# Patient Record
Sex: FEMALE | Race: WHITE | NOT HISPANIC OR LATINO
[De-identification: names, ages, dates, MRNs, and addresses within clinical notes are randomized per-mention and may not be internally consistent; named-entity substitution may affect disease eponyms.]

---

## 2017-05-02 ENCOUNTER — APPOINTMENT (OUTPATIENT)
Dept: OTOLARYNGOLOGY | Facility: CLINIC | Age: 77
End: 2017-05-02

## 2017-05-02 VITALS
DIASTOLIC BLOOD PRESSURE: 77 MMHG | HEART RATE: 85 BPM | SYSTOLIC BLOOD PRESSURE: 119 MMHG | TEMPERATURE: 98.6 F | OXYGEN SATURATION: 97 %

## 2017-05-02 VITALS — HEIGHT: 62 IN | WEIGHT: 147 LBS | BODY MASS INDEX: 27.05 KG/M2

## 2017-05-02 DIAGNOSIS — M26.602 LEFT TEMPOROMANDIBULAR JOINT DISORDER,: ICD-10-CM

## 2017-05-02 DIAGNOSIS — H90.3 SENSORINEURAL HEARING LOSS, BILATERAL: ICD-10-CM

## 2017-05-26 ENCOUNTER — APPOINTMENT (OUTPATIENT)
Dept: OTOLARYNGOLOGY | Facility: CLINIC | Age: 77
End: 2017-05-26

## 2017-06-07 ENCOUNTER — APPOINTMENT (OUTPATIENT)
Dept: OTOLARYNGOLOGY | Facility: CLINIC | Age: 77
End: 2017-06-07

## 2017-06-07 VITALS — SYSTOLIC BLOOD PRESSURE: 129 MMHG | DIASTOLIC BLOOD PRESSURE: 81 MMHG | HEART RATE: 80 BPM | OXYGEN SATURATION: 99 %

## 2017-06-07 DIAGNOSIS — H81.43 VERTIGO OF CENTRAL ORIGIN, BILATERAL: ICD-10-CM

## 2017-06-13 ENCOUNTER — APPOINTMENT (OUTPATIENT)
Dept: OTOLARYNGOLOGY | Facility: CLINIC | Age: 77
End: 2017-06-13

## 2017-06-14 ENCOUNTER — APPOINTMENT (OUTPATIENT)
Dept: OTOLARYNGOLOGY | Facility: CLINIC | Age: 77
End: 2017-06-14

## 2017-08-22 ENCOUNTER — EMERGENCY (EMERGENCY)
Facility: HOSPITAL | Age: 77
LOS: 1 days | Discharge: HOME | End: 2017-08-22
Attending: EMERGENCY MEDICINE | Admitting: EMERGENCY MEDICINE
Payer: MEDICARE

## 2017-08-22 VITALS
DIASTOLIC BLOOD PRESSURE: 73 MMHG | SYSTOLIC BLOOD PRESSURE: 129 MMHG | OXYGEN SATURATION: 98 % | RESPIRATION RATE: 18 BRPM | TEMPERATURE: 98 F | HEART RATE: 63 BPM

## 2017-08-22 VITALS
OXYGEN SATURATION: 98 % | TEMPERATURE: 98 F | SYSTOLIC BLOOD PRESSURE: 130 MMHG | RESPIRATION RATE: 18 BRPM | DIASTOLIC BLOOD PRESSURE: 80 MMHG | WEIGHT: 145.06 LBS | HEART RATE: 58 BPM

## 2017-08-22 LAB
ALBUMIN SERPL ELPH-MCNC: 3.7 G/DL — SIGNIFICANT CHANGE UP (ref 3.3–5)
ALP SERPL-CCNC: 48 U/L — SIGNIFICANT CHANGE UP (ref 40–120)
ALT FLD-CCNC: 18 U/L — SIGNIFICANT CHANGE UP (ref 10–45)
ANION GAP SERPL CALC-SCNC: 13 MMOL/L — SIGNIFICANT CHANGE UP (ref 5–17)
APTT BLD: 29.7 SEC — SIGNIFICANT CHANGE UP (ref 27.5–37.4)
AST SERPL-CCNC: 18 U/L — SIGNIFICANT CHANGE UP (ref 10–40)
BASOPHILS NFR BLD AUTO: 0.8 % — SIGNIFICANT CHANGE UP (ref 0–2)
BILIRUB SERPL-MCNC: 0.4 MG/DL — SIGNIFICANT CHANGE UP (ref 0.2–1.2)
BUN SERPL-MCNC: 12 MG/DL — SIGNIFICANT CHANGE UP (ref 7–23)
CALCIUM SERPL-MCNC: 9.5 MG/DL — SIGNIFICANT CHANGE UP (ref 8.4–10.5)
CHLORIDE SERPL-SCNC: 105 MMOL/L — SIGNIFICANT CHANGE UP (ref 96–108)
CK MB CFR SERPL CALC: 1.3 NG/ML — SIGNIFICANT CHANGE UP (ref 0–6.7)
CO2 SERPL-SCNC: 24 MMOL/L — SIGNIFICANT CHANGE UP (ref 22–31)
CREAT SERPL-MCNC: 0.8 MG/DL — SIGNIFICANT CHANGE UP (ref 0.5–1.3)
EOSINOPHIL NFR BLD AUTO: 0.8 % — SIGNIFICANT CHANGE UP (ref 0–6)
GLUCOSE SERPL-MCNC: 113 MG/DL — HIGH (ref 70–99)
HCT VFR BLD CALC: 41.3 % — SIGNIFICANT CHANGE UP (ref 34.5–45)
HGB BLD-MCNC: 13.8 G/DL — SIGNIFICANT CHANGE UP (ref 11.5–15.5)
INR BLD: 1.15 — SIGNIFICANT CHANGE UP (ref 0.88–1.16)
LYMPHOCYTES # BLD AUTO: 25.9 % — SIGNIFICANT CHANGE UP (ref 13–44)
MAGNESIUM SERPL-MCNC: 2.3 MG/DL — SIGNIFICANT CHANGE UP (ref 1.6–2.6)
MCHC RBC-ENTMCNC: 29.8 PG — SIGNIFICANT CHANGE UP (ref 27–34)
MCHC RBC-ENTMCNC: 33.4 G/DL — SIGNIFICANT CHANGE UP (ref 32–36)
MCV RBC AUTO: 89.2 FL — SIGNIFICANT CHANGE UP (ref 80–100)
MONOCYTES NFR BLD AUTO: 11.1 % — SIGNIFICANT CHANGE UP (ref 2–14)
NEUTROPHILS NFR BLD AUTO: 61.4 % — SIGNIFICANT CHANGE UP (ref 43–77)
PLATELET # BLD AUTO: 225 K/UL — SIGNIFICANT CHANGE UP (ref 150–400)
POTASSIUM SERPL-MCNC: 4 MMOL/L — SIGNIFICANT CHANGE UP (ref 3.5–5.3)
POTASSIUM SERPL-SCNC: 4 MMOL/L — SIGNIFICANT CHANGE UP (ref 3.5–5.3)
PROT SERPL-MCNC: 7.2 G/DL — SIGNIFICANT CHANGE UP (ref 6–8.3)
PROTHROM AB SERPL-ACNC: 12.8 SEC — HIGH (ref 9.8–12.7)
RBC # BLD: 4.63 M/UL — SIGNIFICANT CHANGE UP (ref 3.8–5.2)
RBC # FLD: 12.1 % — SIGNIFICANT CHANGE UP (ref 10.3–16.9)
SODIUM SERPL-SCNC: 142 MMOL/L — SIGNIFICANT CHANGE UP (ref 135–145)
TROPONIN T SERPL-MCNC: <0.01 NG/ML — SIGNIFICANT CHANGE UP (ref 0–0.01)
WBC # BLD: 4.9 K/UL — SIGNIFICANT CHANGE UP (ref 3.8–10.5)
WBC # FLD AUTO: 4.9 K/UL — SIGNIFICANT CHANGE UP (ref 3.8–10.5)

## 2017-08-22 PROCEDURE — 80053 COMPREHEN METABOLIC PANEL: CPT

## 2017-08-22 PROCEDURE — 85610 PROTHROMBIN TIME: CPT

## 2017-08-22 PROCEDURE — 71020: CPT | Mod: 26

## 2017-08-22 PROCEDURE — 71046 X-RAY EXAM CHEST 2 VIEWS: CPT

## 2017-08-22 PROCEDURE — 85025 COMPLETE CBC W/AUTO DIFF WBC: CPT

## 2017-08-22 PROCEDURE — 82550 ASSAY OF CK (CPK): CPT

## 2017-08-22 PROCEDURE — 83735 ASSAY OF MAGNESIUM: CPT

## 2017-08-22 PROCEDURE — 93005 ELECTROCARDIOGRAM TRACING: CPT

## 2017-08-22 PROCEDURE — 85730 THROMBOPLASTIN TIME PARTIAL: CPT

## 2017-08-22 PROCEDURE — 84484 ASSAY OF TROPONIN QUANT: CPT

## 2017-08-22 PROCEDURE — 99285 EMERGENCY DEPT VISIT HI MDM: CPT | Mod: 25

## 2017-08-22 PROCEDURE — 93010 ELECTROCARDIOGRAM REPORT: CPT

## 2017-08-22 PROCEDURE — 36415 COLL VENOUS BLD VENIPUNCTURE: CPT

## 2017-08-22 PROCEDURE — 99284 EMERGENCY DEPT VISIT MOD MDM: CPT | Mod: 25

## 2017-08-22 PROCEDURE — 82553 CREATINE MB FRACTION: CPT

## 2017-08-22 RX ORDER — SODIUM CHLORIDE 9 MG/ML
1000 INJECTION INTRAMUSCULAR; INTRAVENOUS; SUBCUTANEOUS ONCE
Qty: 0 | Refills: 0 | Status: COMPLETED | OUTPATIENT
Start: 2017-08-22 | End: 2017-08-22

## 2017-08-22 RX ADMIN — SODIUM CHLORIDE 2000 MILLILITER(S): 9 INJECTION INTRAMUSCULAR; INTRAVENOUS; SUBCUTANEOUS at 17:12

## 2017-08-22 NOTE — ED ADULT NURSE NOTE - ADDITIONAL PRINTED INSTRUCTIONS GIVEN
Dc with instructions for followup patient to stay hydrated, followup with PMD and return to ER for worsening symptoms.

## 2017-08-22 NOTE — ED PROVIDER NOTE - PHYSICAL EXAMINATION
CONSTITUTIONAL: Well-appearing; well-nourished; in no apparent distress.   HEAD: Normocephalic; atraumatic.   EYES: PERRL; EOM intact; conjunctiva and sclera clear  ENT: normal nose; no rhinorrhea; normal pharynx with no erythema or lesions.   NECK: Supple; non-tender;   CARDIOVASCULAR: Normal S1, S2; no murmurs, rubs, or gallops. Regular rate and rhythm.   RESPIRATORY: Breathing easily; breath sounds clear and equal bilaterally; no wheezes, rhonchi, or rales.  GI: Soft; non-distended; non-tender; no palpable organomegaly.   MSK: FROM at all extremities, normal tone   EXT: No cyanosis or edema; N/V intact  SKIN: Normal for age and race; warm; dry; good turgor; no apparent lesions or rash.   NEURO: A & O x 3; face symmetric; grossly unremarkable.   PSYCHOLOGICAL: The patient’s mood and manner are appropriate.

## 2017-08-22 NOTE — ED ADULT NURSE NOTE - OBJECTIVE STATEMENT
patient states that she woke up feeling very weak and tired. she states that she went to take a nap around 330 PM because she was so tired. she was talking to her friend who told her to take a candy because maybe her blood sugar is low. after having 2 candies patient reports feeling a lot better. denies chest pain, sob, fever, chills. no medical problems. labs drawn and sent. will continue to monitor

## 2017-08-22 NOTE — ED ADULT TRIAGE NOTE - OTHER COMPLAINTS
c/o of sudden onset shortness of breath around 1pm with episode of pain to left side of body, c/o of generealized weakness

## 2017-08-22 NOTE — ED PROVIDER NOTE - MEDICAL DECISION MAKING DETAILS
78 yo f with no pmh c/o an episode of sob, weakness, palpitations and L side body pain. Vitals wnl. Sat 98%. Pt appears well, no focal neuro deficits. Cardio rrr, nml s1s2. lungs cta b/l. EKG NSR @ 67. Wells neg. Labs, CXR, re-assess.

## 2017-08-22 NOTE — ED PROVIDER NOTE - PROGRESS NOTE DETAILS
Labs, CXR unremarkable. Pt feeling better. Spoke with her PMD Dr. Levi, pt can be discharged and will f/u in his office later this week.

## 2017-08-22 NOTE — ED PROVIDER NOTE - OBJECTIVE STATEMENT
76 yo F with no pmh c/o sob and generalized weakness. Pt states she felt fine this morning but around 2:00pm when she was in her apartment she started to feel generalized fatigue and weakness. Pt started experiencing pain on the L side of her body. Associated with sob and palpitations. Pt states she went to her bed to lie down. Pt states she is not  sure if she passed out or fell asleep. When she woke up she was feeling worse. Pt called her sister who told her to eat a piece of candy. Pt states after she ate the candy she felt a little better. Pt reports 2 episodes of loose stool at that time. Denies fever, chills, cough, cp, HA, speech difficulty, visual changes, abd pain, n/v, blood in the stool, dysuria, hematuria, LE swelling. Denies recent travel. Denies FH of CAD. Denies smoking. Pt has never had anything like this happen before.

## 2017-08-25 DIAGNOSIS — R42 DIZZINESS AND GIDDINESS: ICD-10-CM

## 2017-08-25 DIAGNOSIS — R06.02 SHORTNESS OF BREATH: ICD-10-CM

## 2017-08-25 DIAGNOSIS — Z79.899 OTHER LONG TERM (CURRENT) DRUG THERAPY: ICD-10-CM

## 2017-08-25 DIAGNOSIS — R53.1 WEAKNESS: ICD-10-CM

## 2017-08-25 DIAGNOSIS — Z88.0 ALLERGY STATUS TO PENICILLIN: ICD-10-CM

## 2017-08-25 DIAGNOSIS — Z88.5 ALLERGY STATUS TO NARCOTIC AGENT: ICD-10-CM

## 2017-08-25 DIAGNOSIS — I10 ESSENTIAL (PRIMARY) HYPERTENSION: ICD-10-CM

## 2017-08-30 ENCOUNTER — EMERGENCY (EMERGENCY)
Facility: HOSPITAL | Age: 77
LOS: 1 days | Discharge: HOME | End: 2017-08-30
Admitting: EMERGENCY MEDICINE
Payer: MEDICARE

## 2017-08-30 VITALS
RESPIRATION RATE: 18 BRPM | OXYGEN SATURATION: 98 % | SYSTOLIC BLOOD PRESSURE: 136 MMHG | HEART RATE: 69 BPM | DIASTOLIC BLOOD PRESSURE: 85 MMHG | TEMPERATURE: 97 F | HEIGHT: 63 IN | WEIGHT: 149.91 LBS

## 2017-08-30 DIAGNOSIS — Z88.5 ALLERGY STATUS TO NARCOTIC AGENT: ICD-10-CM
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
## 2017-08-30 DIAGNOSIS — S09.90XA UNSPECIFIED INJURY OF HEAD, INITIAL ENCOUNTER: ICD-10-CM

## 2017-08-30 DIAGNOSIS — Z88.0 ALLERGY STATUS TO PENICILLIN: ICD-10-CM
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
## 2017-08-30 DIAGNOSIS — I10 ESSENTIAL (PRIMARY) HYPERTENSION: ICD-10-CM

## 2017-08-30 PROCEDURE — 70450 CT HEAD/BRAIN W/O DYE: CPT

## 2017-08-30 PROCEDURE — 70450 CT HEAD/BRAIN W/O DYE: CPT | Mod: 26

## 2017-08-30 PROCEDURE — 99284 EMERGENCY DEPT VISIT MOD MDM: CPT | Mod: 25

## 2017-08-30 PROCEDURE — 99284 EMERGENCY DEPT VISIT MOD MDM: CPT

## 2017-08-30 RX ORDER — ACETAMINOPHEN 500 MG
650 TABLET ORAL ONCE
Qty: 0 | Refills: 0 | Status: COMPLETED | OUTPATIENT
Start: 2017-08-30 | End: 2017-08-30

## 2017-08-30 RX ADMIN — Medication 650 MILLIGRAM(S): at 22:38

## 2017-08-30 NOTE — ED PROVIDER NOTE - OBJECTIVE STATEMENT
78 yo female in the ER after accidental head injury tonight at home. pt did yoga and hit the back of her scalp on the metal chair. Pt had dizziness and lightheadedness right after that, c/o HA and slight nausea. no LOC, no vision changes or weakness. concerned about possible bleeding in her brain as the impact was very strong. Denies any neck pain and has NROM to her neck.

## 2017-08-30 NOTE — ED PROVIDER NOTE - MUSCULOSKELETAL, MLM
Spine appears normal, range of motion is not limited, no muscle or joint tenderness, no cervical spin point of tenderness and NROM

## 2017-08-30 NOTE — ED ADULT NURSE NOTE - OBJECTIVE STATEMENT
Pt presents to ED c/o scalp pain s/p head injury. Pt reports bumping the L posterior parietal area of her head on a chair "a few hours" PTA. Pt denies LOC at time of incident. On arrival to ED pt endorses 3/10 pain to L posterior scalp though states "I don't have a headache." Pt also denies dizziness/lightheadedness, visual changes, numbness/tingling. Pt presents in NAD speaking full sentences ambulatory through triage with steady gait. Small area of swelling noted, no skin break.

## 2017-08-30 NOTE — ED PROVIDER NOTE - MEDICAL DECISION MAKING DETAILS
76 yo female after head injury, no LOC, unremarkable exam and normal head CT. pt stable for discharge. returned precautions discussed.

## 2017-08-30 NOTE — ED ADULT TRIAGE NOTE - ARRIVAL INFO ADDITIONAL COMMENTS
states she was getting on a yoga mat, leaned back and bumped her head on a chair "a few hour ago". denies any headache, dizziness, n/v, blurry vision. no distress noted, steady gait noted. small bump noted to back of head, skin is intact.

## 2017-10-04 ENCOUNTER — EMERGENCY (EMERGENCY)
Facility: HOSPITAL | Age: 77
LOS: 1 days | Discharge: HOME | End: 2017-10-04
Attending: EMERGENCY MEDICINE | Admitting: EMERGENCY MEDICINE
Payer: MEDICARE

## 2017-10-04 VITALS
HEART RATE: 65 BPM | OXYGEN SATURATION: 100 % | SYSTOLIC BLOOD PRESSURE: 129 MMHG | DIASTOLIC BLOOD PRESSURE: 74 MMHG | RESPIRATION RATE: 16 BRPM | TEMPERATURE: 98 F

## 2017-10-04 VITALS
OXYGEN SATURATION: 96 % | SYSTOLIC BLOOD PRESSURE: 124 MMHG | HEART RATE: 91 BPM | RESPIRATION RATE: 16 BRPM | DIASTOLIC BLOOD PRESSURE: 85 MMHG | TEMPERATURE: 98 F

## 2017-10-04 DIAGNOSIS — R07.89 OTHER CHEST PAIN: ICD-10-CM

## 2017-10-04 DIAGNOSIS — F41.0 PANIC DISORDER [EPISODIC PAROXYSMAL ANXIETY]: ICD-10-CM

## 2017-10-04 DIAGNOSIS — Z88.5 ALLERGY STATUS TO NARCOTIC AGENT: ICD-10-CM

## 2017-10-04 DIAGNOSIS — I10 ESSENTIAL (PRIMARY) HYPERTENSION: ICD-10-CM

## 2017-10-04 DIAGNOSIS — R20.2 PARESTHESIA OF SKIN: ICD-10-CM

## 2017-10-04 DIAGNOSIS — K22.70 BARRETT'S ESOPHAGUS WITHOUT DYSPLASIA: ICD-10-CM

## 2017-10-04 DIAGNOSIS — Z88.0 ALLERGY STATUS TO PENICILLIN: ICD-10-CM

## 2017-10-04 LAB
ALBUMIN SERPL ELPH-MCNC: 4.2 G/DL — SIGNIFICANT CHANGE UP (ref 3.3–5)
ALP SERPL-CCNC: 45 U/L — SIGNIFICANT CHANGE UP (ref 40–120)
ALT FLD-CCNC: 12 U/L — SIGNIFICANT CHANGE UP (ref 10–45)
ANION GAP SERPL CALC-SCNC: 17 MMOL/L — SIGNIFICANT CHANGE UP (ref 5–17)
APTT BLD: 28.9 SEC — SIGNIFICANT CHANGE UP (ref 27.5–37.4)
AST SERPL-CCNC: 16 U/L — SIGNIFICANT CHANGE UP (ref 10–40)
BASOPHILS NFR BLD AUTO: 0.4 % — SIGNIFICANT CHANGE UP (ref 0–2)
BILIRUB SERPL-MCNC: 0.6 MG/DL — SIGNIFICANT CHANGE UP (ref 0.2–1.2)
BUN SERPL-MCNC: 13 MG/DL — SIGNIFICANT CHANGE UP (ref 7–23)
CALCIUM SERPL-MCNC: 10.1 MG/DL — SIGNIFICANT CHANGE UP (ref 8.4–10.5)
CHLORIDE SERPL-SCNC: 102 MMOL/L — SIGNIFICANT CHANGE UP (ref 96–108)
CK MB CFR SERPL CALC: 1.5 NG/ML — SIGNIFICANT CHANGE UP (ref 0–6.7)
CK SERPL-CCNC: 46 U/L — SIGNIFICANT CHANGE UP (ref 25–170)
CO2 SERPL-SCNC: 22 MMOL/L — SIGNIFICANT CHANGE UP (ref 22–31)
CREAT SERPL-MCNC: 0.78 MG/DL — SIGNIFICANT CHANGE UP (ref 0.5–1.3)
EOSINOPHIL NFR BLD AUTO: 0.2 % — SIGNIFICANT CHANGE UP (ref 0–6)
GLUCOSE SERPL-MCNC: 104 MG/DL — HIGH (ref 70–99)
HCT VFR BLD CALC: 42.3 % — SIGNIFICANT CHANGE UP (ref 34.5–45)
HGB BLD-MCNC: 14.9 G/DL — SIGNIFICANT CHANGE UP (ref 11.5–15.5)
INR BLD: 1.17 — HIGH (ref 0.88–1.16)
LYMPHOCYTES # BLD AUTO: 20.7 % — SIGNIFICANT CHANGE UP (ref 13–44)
MCHC RBC-ENTMCNC: 30.9 PG — SIGNIFICANT CHANGE UP (ref 27–34)
MCHC RBC-ENTMCNC: 35.2 G/DL — SIGNIFICANT CHANGE UP (ref 32–36)
MCV RBC AUTO: 87.8 FL — SIGNIFICANT CHANGE UP (ref 80–100)
MONOCYTES NFR BLD AUTO: 6.2 % — SIGNIFICANT CHANGE UP (ref 2–14)
NEUTROPHILS NFR BLD AUTO: 72.5 % — SIGNIFICANT CHANGE UP (ref 43–77)
PLATELET # BLD AUTO: 213 K/UL — SIGNIFICANT CHANGE UP (ref 150–400)
POTASSIUM SERPL-MCNC: 3.8 MMOL/L — SIGNIFICANT CHANGE UP (ref 3.5–5.3)
POTASSIUM SERPL-SCNC: 3.8 MMOL/L — SIGNIFICANT CHANGE UP (ref 3.5–5.3)
PROT SERPL-MCNC: 7.4 G/DL — SIGNIFICANT CHANGE UP (ref 6–8.3)
PROTHROM AB SERPL-ACNC: 13 SEC — HIGH (ref 9.8–12.7)
RBC # BLD: 4.82 M/UL — SIGNIFICANT CHANGE UP (ref 3.8–5.2)
RBC # FLD: 12.3 % — SIGNIFICANT CHANGE UP (ref 10.3–16.9)
SODIUM SERPL-SCNC: 141 MMOL/L — SIGNIFICANT CHANGE UP (ref 135–145)
TROPONIN T SERPL-MCNC: <0.01 NG/ML — SIGNIFICANT CHANGE UP (ref 0–0.01)
WBC # BLD: 4.7 K/UL — SIGNIFICANT CHANGE UP (ref 3.8–10.5)
WBC # FLD AUTO: 4.7 K/UL — SIGNIFICANT CHANGE UP (ref 3.8–10.5)

## 2017-10-04 PROCEDURE — 85025 COMPLETE CBC W/AUTO DIFF WBC: CPT

## 2017-10-04 PROCEDURE — 84484 ASSAY OF TROPONIN QUANT: CPT

## 2017-10-04 PROCEDURE — 82553 CREATINE MB FRACTION: CPT

## 2017-10-04 PROCEDURE — 93010 ELECTROCARDIOGRAM REPORT: CPT

## 2017-10-04 PROCEDURE — 85610 PROTHROMBIN TIME: CPT

## 2017-10-04 PROCEDURE — 99283 EMERGENCY DEPT VISIT LOW MDM: CPT | Mod: 25

## 2017-10-04 PROCEDURE — 85730 THROMBOPLASTIN TIME PARTIAL: CPT

## 2017-10-04 PROCEDURE — 99285 EMERGENCY DEPT VISIT HI MDM: CPT | Mod: 25

## 2017-10-04 PROCEDURE — 82550 ASSAY OF CK (CPK): CPT

## 2017-10-04 PROCEDURE — 36415 COLL VENOUS BLD VENIPUNCTURE: CPT

## 2017-10-04 PROCEDURE — 93005 ELECTROCARDIOGRAM TRACING: CPT

## 2017-10-04 PROCEDURE — 80053 COMPREHEN METABOLIC PANEL: CPT

## 2017-10-04 NOTE — ED PROVIDER NOTE - MEDICAL DECISION MAKING DETAILS
77F with above PMHx with episode of CP and belching follow by shaking and hand tingling, suspect panic attack. EKG no ischemia. VSS. Pt very anxious appearing on exam, Will check labs, CXR, and reassess.

## 2017-10-04 NOTE — ED PROVIDER NOTE - OBJECTIVE STATEMENT
77F with a h/o Bolton's esophagus, TMJ, and HTN who p/w episode of Left chest pain with radiation to left breast and back, "squeezing"  with associated belching and followed by SOb, arm shaking and bilateral hand tingling. Pt report multiple similar episodes of the same type of episode and she report having extensive workup up by PMD, Cards, and GI with "every test out there." She recently saw her GI, Dr. Arcos, who believes her CP is due to GERD - pt is taking PPI and has a repeat EGD schedule for tomorrow. Pt currently has no symptoms. She has been Rx's clonazepam for anxiety by her PMD but stopped taking it.

## 2017-10-04 NOTE — ED PROVIDER NOTE - PHYSICAL EXAMINATION
GEN: Well appearing, well nourished, awake, alert, oriented to person, place, time/situation and in no apparent distress.  ENT: Airway patent, Nasal mucosa clear. Mouth with normal mucosa.  EYES: Clear bilaterally.  RESPIRATORY: Breathing comfortably with normal RR.  CARDIAC: Regular rate and rhythm  ABDOMEN: Soft, nontender, +bowel sounds, no rebound, rigidity, or guarding. Belching noted.  MSK: Range of motion is not limited, no deformities noted.  NEURO: Alert and oriented, no focal deficits.  SKIN: Skin normal color for race, warm, dry and intact. No evidence of rash.  PSYCH: Alert and oriented to person, place, time/situation. anxious, normal affect. no apparent risk to self or others.

## 2017-10-04 NOTE — ED PROVIDER NOTE - PROGRESS NOTE DETAILS
Pt denies further sx. No emergent findings on labs, EKG no ischemia. Pt refuses CXR bc she has "had too many." I suspect pt's sx were due to episode fo GERD followed by panic attack. pt was advised to see a psychiatrist or a psychologist, which she has not done yet. Pt wants to go home and was advised to f/u closely with her Drs. Low suspicion for CAD, PE, dissection or other life threatening patholgy as pt report negative workups in the past and is currently asymptomatic with stable VS.   WIll Dc.

## 2019-03-04 PROBLEM — K21.9 GASTRO-ESOPHAGEAL REFLUX DISEASE WITHOUT ESOPHAGITIS: Chronic | Status: ACTIVE | Noted: 2017-10-04

## 2019-03-20 ENCOUNTER — APPOINTMENT (OUTPATIENT)
Dept: OTOLARYNGOLOGY | Facility: CLINIC | Age: 79
End: 2019-03-20

## 2019-03-26 ENCOUNTER — APPOINTMENT (OUTPATIENT)
Dept: OTOLARYNGOLOGY | Facility: CLINIC | Age: 79
End: 2019-03-26
Payer: MEDICARE

## 2019-03-26 VITALS
RESPIRATION RATE: 14 BRPM | OXYGEN SATURATION: 98 % | DIASTOLIC BLOOD PRESSURE: 74 MMHG | TEMPERATURE: 98.2 F | HEART RATE: 76 BPM | SYSTOLIC BLOOD PRESSURE: 137 MMHG

## 2019-03-26 DIAGNOSIS — H90.3 SENSORINEURAL HEARING LOSS, BILATERAL: ICD-10-CM

## 2019-03-26 PROCEDURE — 99214 OFFICE O/P EST MOD 30 MIN: CPT | Mod: 25

## 2019-03-26 PROCEDURE — 92557 COMPREHENSIVE HEARING TEST: CPT

## 2019-03-26 PROCEDURE — 92550 TYMPANOMETRY & REFLEX THRESH: CPT

## 2019-03-26 PROCEDURE — 31231 NASAL ENDOSCOPY DX: CPT

## 2019-03-26 NOTE — HISTORY OF PRESENT ILLNESS
[de-identified] : 80 yo woman for the past few d has had little bits of blood come from her nose- yesterday she has what she described as a major nosebleed. It happened in the evening, no antecedent event or trauma. No antiplatelet meds. Took 10 minutes to stop but she did not compress nose. She also relates many years of b hearing loss and wished to see where her hearing level is at currently.  She denies vertigo. no fh relevant to cc.

## 2019-03-26 NOTE — PROCEDURE
[Epistaxis] : evaluation of epistaxis [Anterior rhinoscopy insufficient to account for symptoms] : anterior rhinoscopy insufficient to account for symptoms [Topical Lidocaine] : topical lidocaine [Oxymetazoline HCl] : oxymetazoline HCl [Flexible Endoscope] : examined with the flexible endoscope [Serial Number: ___] : Serial Number: [unfilled] [Cauterized w/Silver Nitrate] : the bleeding was cauterized with silver nitrate [Normal] : the paranasal sinuses had no abnormalities [FreeTextEntry6] : done for epistaxis - vessel r adilson's area onmly [FreeTextEntry2] : vessel on r little's area

## 2019-03-26 NOTE — PHYSICAL EXAM
[Nasal Endoscopy Performed] : nasal endoscopy was performed, see procedure section for findings [] : septum deviated to the right [de-identified] : r ant nose vessel with evidence of recent bleed - little's area [Midline] : trachea located in midline position [Normal] : no rashes

## 2019-04-10 ENCOUNTER — APPOINTMENT (OUTPATIENT)
Dept: OTOLARYNGOLOGY | Facility: CLINIC | Age: 79
End: 2019-04-10
Payer: MEDICARE

## 2019-04-10 VITALS
TEMPERATURE: 97.9 F | OXYGEN SATURATION: 98 % | HEART RATE: 77 BPM | SYSTOLIC BLOOD PRESSURE: 110 MMHG | DIASTOLIC BLOOD PRESSURE: 72 MMHG | RESPIRATION RATE: 15 BRPM

## 2019-04-10 VITALS — DIASTOLIC BLOOD PRESSURE: 73 MMHG | HEART RATE: 75 BPM | SYSTOLIC BLOOD PRESSURE: 109 MMHG

## 2019-04-10 DIAGNOSIS — J32.2 CHRONIC ETHMOIDAL SINUSITIS: ICD-10-CM

## 2019-04-10 PROCEDURE — 99214 OFFICE O/P EST MOD 30 MIN: CPT

## 2019-04-10 NOTE — HISTORY OF PRESENT ILLNESS
[de-identified] : followup 78 yo woman with headache and epistaxis. no major episodes since last visit. She still has small amts of blood in nasal drainge. tongue is dry in am. c/o b bad headache in the tem,poral regions radiatign to the forehead. She grinds her teeth and had a mouth guard but had to stop it because of mout irritation. denies hl the headache is moderate. I offered to call amulance but she does not wish to go to e.r.

## 2019-04-15 ENCOUNTER — APPOINTMENT (OUTPATIENT)
Dept: OTOLARYNGOLOGY | Facility: CLINIC | Age: 79
End: 2019-04-15

## 2019-04-19 ENCOUNTER — APPOINTMENT (OUTPATIENT)
Dept: OTOLARYNGOLOGY | Facility: CLINIC | Age: 79
End: 2019-04-19
Payer: MEDICARE

## 2019-04-19 VITALS
OXYGEN SATURATION: 98 % | TEMPERATURE: 98.1 F | SYSTOLIC BLOOD PRESSURE: 118 MMHG | DIASTOLIC BLOOD PRESSURE: 77 MMHG | HEART RATE: 65 BPM

## 2019-04-19 DIAGNOSIS — R04.0 EPISTAXIS: ICD-10-CM

## 2019-04-19 PROCEDURE — 99213 OFFICE O/P EST LOW 20 MIN: CPT

## 2019-04-19 NOTE — REASON FOR VISIT
[Subsequent Evaluation] : a subsequent evaluation for [FreeTextEntry2] : followu headache and epsitaxis -

## 2019-04-19 NOTE — HISTORY OF PRESENT ILLNESS
[de-identified] : followup 80 yo woman with  headache and epistaxis - she has had no more epistaxis in the past week. headache is in the area of tmjs had ct sinuses and is here to review results. -f.s.c

## 2020-03-26 ENCOUNTER — APPOINTMENT (OUTPATIENT)
Dept: OTOLARYNGOLOGY | Facility: CLINIC | Age: 80
End: 2020-03-26

## 2020-06-04 ENCOUNTER — APPOINTMENT (OUTPATIENT)
Dept: OTOLARYNGOLOGY | Facility: CLINIC | Age: 80
End: 2020-06-04
Payer: MEDICARE

## 2020-06-04 VITALS
OXYGEN SATURATION: 96 % | TEMPERATURE: 97.7 F | HEART RATE: 84 BPM | DIASTOLIC BLOOD PRESSURE: 77 MMHG | SYSTOLIC BLOOD PRESSURE: 121 MMHG

## 2020-06-04 DIAGNOSIS — J31.0 CHRONIC RHINITIS: ICD-10-CM

## 2020-06-04 DIAGNOSIS — K14.0 GLOSSITIS: ICD-10-CM

## 2020-06-04 DIAGNOSIS — H92.02 OTALGIA, LEFT EAR: ICD-10-CM

## 2020-06-04 DIAGNOSIS — M26.609 UNSPECIFIED TEMPOROMANDIBULAR JOINT DISORDER: ICD-10-CM

## 2020-06-04 PROCEDURE — 99214 OFFICE O/P EST MOD 30 MIN: CPT | Mod: 25

## 2020-06-04 PROCEDURE — 31231 NASAL ENDOSCOPY DX: CPT

## 2020-06-05 NOTE — PHYSICAL EXAM
[Nasal Endoscopy Performed] : nasal endoscopy was performed, see procedure section for findings [Midline] : trachea located in midline position [Normal] : orientation to person, place, and time: normal [de-identified] : b [de-identified] : thin [de-identified] : thin [de-identified] : mild inflammation of tongue with enlarged blood vessels - mild glossitis

## 2020-06-05 NOTE — PROCEDURE
[Recalcitrant Symptoms] : recalcitrant symptoms  [Posterior Lesion] : posterior lesion [Anterior rhinoscopy insufficient to account for symptoms] : anterior rhinoscopy insufficient to account for symptoms [Flexible Endoscope] : examined with the flexible endoscope [Serial Number: ___] : Serial Number: [unfilled] [Pale] : pale [Normal] : the paranasal sinuses had no abnormalities [FreeTextEntry6] : Procedure explained to patient with all risks, benefits and alternatives, all questions answered. Patient positioned sitting upright.. Flexible/Rigid endoscope was used to examine both nasal passages. Right nasal passage with normal inferior and middle turbinates. Left nasal passage with normal inferior and middle turbinates. Nasal septum straight without deviation or perforation. OMU with open outflow tract. No masses, lesions, polyps or purulent drainage. Eustachian opening without masses or swelling. Nasopharynx without obstruction or stenosis.\par Thinning of the mucosa c/w atrophic rhinitis.

## 2020-06-05 NOTE — HISTORY OF PRESENT ILLNESS
[de-identified] : 80F previously seen for otalgia and TMJ, 14 mo ago returns with left otalgia and clear rhinorrhea. She reports for the last couple weeks she has been having left otalgia. She never went to dentist as recommended for tmj last year, which was documented on ct. -f/s/c no covid sx but the nasal drainage is continuous. She denies any hearing changes, tinnitus, otorrhea, vertigo. \par \par She also admits to discoloration of her tongue with associated with mild burning sensation. She denies any dysphagia, masses or bleeding. \par \par She also admits to clear rhinorrhea the last several months.  She denies any anosmia, facial pain/pressure, nasal congestion. \par \par No other ENT complaints. No pertinent FH/Sh.

## 2020-06-08 ENCOUNTER — APPOINTMENT (OUTPATIENT)
Dept: OTOLARYNGOLOGY | Facility: CLINIC | Age: 80
End: 2020-06-08

## 2021-06-02 ENCOUNTER — APPOINTMENT (OUTPATIENT)
Dept: NEUROLOGY | Facility: CLINIC | Age: 81
End: 2021-06-02
Payer: MEDICARE

## 2021-06-02 VITALS
DIASTOLIC BLOOD PRESSURE: 77 MMHG | HEIGHT: 62 IN | SYSTOLIC BLOOD PRESSURE: 117 MMHG | HEART RATE: 77 BPM | TEMPERATURE: 98.1 F | OXYGEN SATURATION: 97 % | BODY MASS INDEX: 27.05 KG/M2 | WEIGHT: 147 LBS

## 2021-06-02 PROCEDURE — 99205 OFFICE O/P NEW HI 60 MIN: CPT

## 2021-06-02 NOTE — PHYSICAL EXAM
[FreeTextEntry1] : MOCA 21/30 \par  \par Mental status: The patient is alert and oriented x3, naming intact x3, repetition normal, follows three-step commands, and is able to participate fully in the history taking. \par Speech is clear and fluent with good repetition, comprehension, and naming. No evidence of dysarthria.\par Memory is intact: Immediate recall 3 out of 5**, short-term 0 out of 5***, remote memory intact. \par \par Cranial nerves II through XII intact: \par CN II: RUQ visual field loss L eye***, otherwise visual fields are full to confrontation. Fundoscopic exam is normal with sharp discs and no vascular changes. Venous pulsations are present bilaterally. Pupils are 4 mm and briskly reactive to light. Visual acuity is 20/20 bilaterally.\par CN III, IV, VI: At primary gaze, there is no eye deviation. EOMI. No ptosis\par CN V: Facial sensation is intact to pinprick in all 3 divisions bilaterally. \par CN VII: Face is symmetric with normal eye closure and smile.\par CN VII: Hearing is decreased in R** \par CN IX, X: Palate elevates symmetrically. Phonation is normal.\par CN XI: Head turning and shoulder shrug are intact\par CN XII: Tongue is midline with normal movements and no atrophy.\par \par Motor exam: Upper and lower extremities 5 out of 5 power, normal muscle tone and bulk. No abnormal movements noted.\par Sensory exam: Light touch, vibration, position sense, and pinprick are intact in fingers and toes. Romberg absent.\par Coordination and vestibular exam: Finger to nose intact, no evidence of truncal or appendicular ataxia. No evidence of nystagmus. No vestibular symptoms elicited with head turning during ambulation.\par Gait: Normal stance and gait. Posture is normal. Gait is steady with normal steps, base, arm swing, and turning. Heel and toe walking are normal. Tandem gait is normal. \par Reflexes: One to 2+ in upper and lower extremities. No pathological reflexes. Downgoing toes.\par \par General physical examination:\par HEENT: Normocephalic atraumatic\par Funduscopic: No disc edema\par Neck supple with no JVD. No evidence of carotid bruit.\par Cardiac: S1-S2 regular rate and rhythm, no murmur noted.\par Chest: Clear to auscultation\par Abdomen: soft, nontender \par Extremity: No edema, normal pulses.\par

## 2021-06-02 NOTE — HISTORY OF PRESENT ILLNESS
[FreeTextEntry1] : Referred by Dr Rios PCP for evaluation of memory loss, accompanied by her nurse at today's visit. Pt is a 81yoF with no significant PMH  c/o worsening short-term memory over past year. 1 year ago was at full capacity and working as , retired over this past year due to covid. Now has episodes of forgetfulness, says she walks to room to get something and forgets why she is there. As per nurse,  told few friends she was concerned since seems to be more forgetful recently. Lives in house alone, is more isolated since the start of pandemic. Does not drive. Has never left stove on or water running and has not gotten lost. Does not find that she is losing things more frequently. Is independent in dressing, bathing, cooking, cleaning. Does her own finances with no issues. Feels lonely but denies any major changes in mood. \par \par Was having constant general body pain from atorvastatin. Statin was d/colin 2 weeks ago, started on nexletol for cholesterol. Now says body pain has resolved except has pain in back of legs when she gets massage. Also has sharp pain from left buttocks radiating down L leg to the knee.  Was started on Mirapex for pain. \par \par Being evaluated by ENT for poor hearing (R worse than left), is having MRI brain tomorrow (with IAC?-likely)\par Denies family history of dementia \par Denies alcohol use or history of smoking \par Is fully vaccinated against Covid \par \par

## 2021-06-02 NOTE — ASSESSMENT
[FreeTextEntry1] : The patient is a 81 year old female with  memory concerns.  MOCA is abnormal. We agree with MRI to evaluate for structural abnormalities. She will send us the images/results.  We will also obtain neuropsych testing and labs to evaluate for treatable causes of dementia (B12, etc).  \par \par Posterior thigh pain seem less like myalgias and more like sciatica based on description.Patient not having symptoms today and overall reports improvement. We would recommend to discontinue Mirapex and consider gabapentin for pain if needed.  Myalgias from atorvastatin are essentially resolved; no further workup is necessary at this point unless symptoms worsen again.

## 2021-06-04 ENCOUNTER — NON-APPOINTMENT (OUTPATIENT)
Age: 81
End: 2021-06-04

## 2021-06-04 LAB
ALBUMIN SERPL ELPH-MCNC: 4.4 G/DL
ALP BLD-CCNC: 50 U/L
ALT SERPL-CCNC: 13 U/L
ANION GAP SERPL CALC-SCNC: 12 MMOL/L
AST SERPL-CCNC: 20 U/L
BASOPHILS # BLD AUTO: 0.04 K/UL
BASOPHILS NFR BLD AUTO: 0.7 %
BILIRUB SERPL-MCNC: 0.2 MG/DL
BUN SERPL-MCNC: 23 MG/DL
CALCIUM SERPL-MCNC: 9.6 MG/DL
CHLORIDE SERPL-SCNC: 107 MMOL/L
CO2 SERPL-SCNC: 25 MMOL/L
CREAT SERPL-MCNC: 0.79 MG/DL
EOSINOPHIL # BLD AUTO: 0.04 K/UL
EOSINOPHIL NFR BLD AUTO: 0.7 %
ESTIMATED AVERAGE GLUCOSE: 108 MG/DL
FOLATE SERPL-MCNC: >20 NG/ML
GLUCOSE SERPL-MCNC: 99 MG/DL
HBA1C MFR BLD HPLC: 5.4 %
HCT VFR BLD CALC: 46.4 %
HGB BLD-MCNC: 14.8 G/DL
IMM GRANULOCYTES NFR BLD AUTO: 0.2 %
LYMPHOCYTES # BLD AUTO: 1.35 K/UL
LYMPHOCYTES NFR BLD AUTO: 22 %
MAN DIFF?: NORMAL
MCHC RBC-ENTMCNC: 31.1 PG
MCHC RBC-ENTMCNC: 31.9 GM/DL
MCV RBC AUTO: 97.5 FL
MONOCYTES # BLD AUTO: 0.38 K/UL
MONOCYTES NFR BLD AUTO: 6.2 %
NEUTROPHILS # BLD AUTO: 4.32 K/UL
NEUTROPHILS NFR BLD AUTO: 70.2 %
PLATELET # BLD AUTO: 259 K/UL
POTASSIUM SERPL-SCNC: 4.8 MMOL/L
PROT SERPL-MCNC: 6.9 G/DL
RBC # BLD: 4.76 M/UL
RBC # FLD: 12.1 %
SODIUM SERPL-SCNC: 144 MMOL/L
TSH SERPL-ACNC: 1.16 UIU/ML
VIT B12 SERPL-MCNC: 1597 PG/ML
WBC # FLD AUTO: 6.14 K/UL

## 2021-06-17 ENCOUNTER — APPOINTMENT (OUTPATIENT)
Dept: NEUROLOGY | Facility: CLINIC | Age: 81
End: 2021-06-17
Payer: MEDICARE

## 2021-06-17 VITALS
OXYGEN SATURATION: 98 % | HEIGHT: 62 IN | SYSTOLIC BLOOD PRESSURE: 109 MMHG | DIASTOLIC BLOOD PRESSURE: 71 MMHG | WEIGHT: 147 LBS | TEMPERATURE: 98.2 F | HEART RATE: 74 BPM | BODY MASS INDEX: 27.05 KG/M2

## 2021-06-17 DIAGNOSIS — L60.9 NAIL DISORDER, UNSPECIFIED: ICD-10-CM

## 2021-06-17 PROCEDURE — 99215 OFFICE O/P EST HI 40 MIN: CPT

## 2021-06-17 RX ORDER — CEFUROXIME AXETIL 500 MG/1
500 TABLET ORAL
Qty: 14 | Refills: 0 | Status: DISCONTINUED | COMMUNITY
Start: 2017-05-02 | End: 2021-06-17

## 2021-06-17 RX ORDER — CHLORHEXIDINE GLUCONATE, 0.12% ORAL RINSE 1.2 MG/ML
0.12 SOLUTION DENTAL
Qty: 1 | Refills: 0 | Status: DISCONTINUED | COMMUNITY
Start: 2020-06-04 | End: 2021-06-17

## 2021-06-17 NOTE — ASSESSMENT
[FreeTextEntry1] : The patient is a very pleasant 81-year-old female who presents with low back pain and intermittent sharp pain that radiates down her left posterior thigh.  She has no associated weakness or other neurologic symptoms.  However, the pain is quite debilitating for her.  I recommended an MRI of her L-spine as well as EMG.  She is also interested in trying gabapentin and I have sent a prescription for her to gradually uptitrate the medication to her pharmacy.  She plans on bringing her list of medications to the office for reconciliation.  I have recommended she start physical therapy and send a referral for her as well for this.  She will return in 4 weeks.

## 2021-06-17 NOTE — PHYSICAL EXAM
[FreeTextEntry1] : Alert.  Fully oriented.  Speech and language are intact.  Her upper extremity exam reveals symmetric and intact strength throughout.  Sensation is normal to light touch, temperature, and vibration.  She also has intact strength throughout bilateral lower extremities.  Reflexes are brisk but symmetric.  Toes are downgoing bilaterally.  She has intact sensation to temperature, vibration, and light touch in both lower extremities.  Finger-to-nose and heel-to-shin is intact.  Gait is normal.

## 2021-06-17 NOTE — HISTORY OF PRESENT ILLNESS
[FreeTextEntry1] : The patient is a very pleasant 81-year-old female who I saw earlier this month for cognitive issues and muscle pain.  She presents today for further evaluation of left leg pain.\par \par The patient states she has had low back pain for 6 to 7 months.  She has also been having "cramping" of the of her left leg intermittently as well during this period.  She has no weakness, numbness associated with the pain.  The pain is described as a sharp, burning sensation long the posterior aspect of her thigh.  She denies any bowel or bladder dysfunction or saddle anesthesia.  No positions particularly trigger or help the pain.  She has been taking Tylenol and Aleve with minimal relief.  She has been limiting her walking because of the pain.

## 2021-07-02 ENCOUNTER — APPOINTMENT (OUTPATIENT)
Dept: MRI IMAGING | Facility: HOSPITAL | Age: 81
End: 2021-07-02

## 2021-07-09 ENCOUNTER — RX RENEWAL (OUTPATIENT)
Age: 81
End: 2021-07-09

## 2021-07-19 ENCOUNTER — APPOINTMENT (OUTPATIENT)
Dept: NEUROLOGY | Facility: CLINIC | Age: 81
End: 2021-07-19

## 2021-07-23 ENCOUNTER — APPOINTMENT (OUTPATIENT)
Dept: NEUROLOGY | Facility: CLINIC | Age: 81
End: 2021-07-23
Payer: MEDICARE

## 2021-07-23 VITALS
HEIGHT: 62 IN | OXYGEN SATURATION: 96 % | BODY MASS INDEX: 26.13 KG/M2 | WEIGHT: 142 LBS | SYSTOLIC BLOOD PRESSURE: 111 MMHG | DIASTOLIC BLOOD PRESSURE: 71 MMHG | HEART RATE: 77 BPM | TEMPERATURE: 98.1 F

## 2021-07-23 PROCEDURE — 99213 OFFICE O/P EST LOW 20 MIN: CPT

## 2021-07-23 NOTE — HISTORY OF PRESENT ILLNESS
[FreeTextEntry1] : The patient is a very pleasant 81-year-old female who I saw in June for cognitive issues and muscle pain. She presents today for follow-up of left leg pain.\par \par The patient continues to have a low back pain that radiates into her left posterior thigh. This is unchanged. She is unsure if she started the gabapentin or not but does believe she got the prescription.  She otherwise complains of 2 to 3 months worth of loose stools which alternates with hard, small stools.  She also feels she has increased urinary frequency.  She has had no bowel/bladder incontinence.  She continues to deny saddle anesthesia. She describes paresthesias of bilateral LE's that is intermittent.

## 2021-07-23 NOTE — ASSESSMENT
[FreeTextEntry1] : The patient is a very pleasant 81-year-old female who I have been following for cognitive impairment and low back pain associated with left lower extremity pain/paresthesias.  She has not underwent any of the recommended studies lately.  I will asked that our office assist her in arranging these.  She has an MRI of her brain and L-spine, EMG, and neuropsych testing all pending. She will follow-up in 1 month to review results.

## 2021-07-23 NOTE — PHYSICAL EXAM
[FreeTextEntry1] : Alert.  Fully oriented.  Speech and language are intact.  Cranial nerves are grossly normal.  She is moving all extremities without difficulty.  No focal weakness.  Gait is normal.

## 2021-08-06 ENCOUNTER — OUTPATIENT (OUTPATIENT)
Dept: OUTPATIENT SERVICES | Facility: HOSPITAL | Age: 81
LOS: 1 days | End: 2021-08-06
Payer: MEDICARE

## 2021-08-06 ENCOUNTER — APPOINTMENT (OUTPATIENT)
Dept: MRI IMAGING | Facility: HOSPITAL | Age: 81
End: 2021-08-06

## 2021-08-06 PROCEDURE — 72148 MRI LUMBAR SPINE W/O DYE: CPT

## 2021-08-06 PROCEDURE — 72148 MRI LUMBAR SPINE W/O DYE: CPT | Mod: 26,MH

## 2021-08-11 ENCOUNTER — APPOINTMENT (OUTPATIENT)
Dept: NEUROLOGY | Facility: CLINIC | Age: 81
End: 2021-08-11
Payer: MEDICARE

## 2021-08-11 VITALS
DIASTOLIC BLOOD PRESSURE: 78 MMHG | WEIGHT: 145 LBS | OXYGEN SATURATION: 95 % | BODY MASS INDEX: 26.52 KG/M2 | TEMPERATURE: 89.4 F | SYSTOLIC BLOOD PRESSURE: 121 MMHG | HEART RATE: 72 BPM

## 2021-08-11 PROCEDURE — 99214 OFFICE O/P EST MOD 30 MIN: CPT | Mod: 25

## 2021-08-11 PROCEDURE — 95886 MUSC TEST DONE W/N TEST COMP: CPT

## 2021-08-11 PROCEDURE — 95910 NRV CNDJ TEST 7-8 STUDIES: CPT

## 2021-08-11 NOTE — PROCEDURE
[FreeTextEntry1] : \par Nerve Conduction and Electromyography Report [FreeTextEntry3] : \par Electro Physiologic Findings:\par \par Limb temperature was monitored and maintained at 30 – 34° C in the lower extremities. \par \par The superficial fibular sensory response on the left was mildly slow; the amplitudes were normal bilaterally. The right sural sensory response was normal. \par \par The right fibular motor amplitude was borderline, with normal velocity; the left fibular motor response was normal. The tibial motor amplitudes were normal bilaterally. The tibial and fibular F-wave latencies were also normal bilaterally and symmetric. \par \par Needle electromyography was performed on select bilateral lower extremity L4-S2 appendicular muscles and the bilateral L5/S1 paraspinals. All muscles tested were normal without evidence of active or chronic denervation. \par \par Clinical Electrophysiological Impression: \par \par This was an unremarkable electrodiagnostic study of the lower extremities. There was no definite evidence of polyneuropathy or lower lumbar radiculopathy on either side.

## 2021-08-11 NOTE — HISTORY OF PRESENT ILLNESS
[FreeTextEntry1] : Referred by Dr. Hamilton for pain in her back and legs\par She has vague pain throughout her legs and back, although endorses pain that radiates from the back down the legs\par Right is worse than the left \par She also has pain in the feet when she walks \par She denies numbness, tingling, weakness \par Symptoms started 2-3 years ago, getting worse over time \par \par Personally reviewed and interpreted:\par MRI L spine - degenerative disc disease at L4/5 and L5/S1 with compression of left descending S1 nerve root in lateral recess

## 2021-08-11 NOTE — ASSESSMENT
[FreeTextEntry1] : For the most part, her symptoms are not typical for radiculopathy or polyneuropathy, and neither was seen on NCS/EMG\par More likely she has a constellation of musculoskeletal conditions causing pain - in her feet, low back, etc\par The pain in the left buttock radiating down the posterior thigh may be due to a mild lower lumbar radiculopathy (below the threshold of detection on NCS/EMG), although this may also be musculoskeletal in etiology \par Suggest PT, NSAIDs as initial therapy \par \par See separate procedure note for full results of NCS/EMG study\par

## 2021-08-11 NOTE — PHYSICAL EXAM
[FreeTextEntry1] : Motor: 5/5 symmetric throughout\par Sensory: LT/PP intact and symmetric in LE b/l\par Reflexes: left achilles 1+, otherwise 2+ throughout\par Gait: normal

## 2021-08-16 ENCOUNTER — APPOINTMENT (OUTPATIENT)
Dept: NEUROLOGY | Facility: CLINIC | Age: 81
End: 2021-08-16
Payer: MEDICARE

## 2021-08-16 VITALS
OXYGEN SATURATION: 96 % | HEART RATE: 80 BPM | HEIGHT: 62 IN | SYSTOLIC BLOOD PRESSURE: 97 MMHG | DIASTOLIC BLOOD PRESSURE: 68 MMHG | TEMPERATURE: 97.7 F

## 2021-08-16 DIAGNOSIS — M54.32 SCIATICA, LEFT SIDE: ICD-10-CM

## 2021-08-16 DIAGNOSIS — M79.605 PAIN IN RIGHT LEG: ICD-10-CM

## 2021-08-16 DIAGNOSIS — M79.604 PAIN IN RIGHT LEG: ICD-10-CM

## 2021-08-16 PROCEDURE — 99213 OFFICE O/P EST LOW 20 MIN: CPT

## 2021-08-16 NOTE — HISTORY OF PRESENT ILLNESS
[FreeTextEntry1] : Patient is a 81-year-old female here for follow-up of low back and lower extremity pain.\par \par The patient is here for follow-up after EMG and MRI L-spine.  L-spine showed multilevel spondylitic changes resulting in mild canal stenosis at L4-L5 as well as moderate foraminal stenosis at L5-S1.  EMG did not reveal evidence of neuropathy or radiculopathy.  The patient states she recently started using cream and NSAIDs for her low back pain/radicular pain with improvement.  She has not yet started gabapentin.  No change in bowel/bladder pattern.  She is interested in pursuing PT.\par \par The patient previously saw me for cognitive impairment as well.  She has not yet performed the MRI of her brain underwent neuropsych testing as previously recommended.  She is not currently interested.  She believes her cognitive issues are largely from isolation and depression.

## 2021-08-23 ENCOUNTER — NON-APPOINTMENT (OUTPATIENT)
Age: 81
End: 2021-08-23

## 2021-08-24 RX ORDER — GABAPENTIN 300 MG/1
300 CAPSULE ORAL
Qty: 90 | Refills: 4 | Status: ACTIVE | COMMUNITY
Start: 2021-06-17 | End: 1900-01-01

## 2021-09-15 ENCOUNTER — APPOINTMENT (OUTPATIENT)
Dept: ENDOCRINOLOGY | Facility: CLINIC | Age: 81
End: 2021-09-15
Payer: MEDICARE

## 2021-09-15 VITALS
WEIGHT: 142 LBS | SYSTOLIC BLOOD PRESSURE: 113 MMHG | BODY MASS INDEX: 25.97 KG/M2 | DIASTOLIC BLOOD PRESSURE: 71 MMHG | HEART RATE: 71 BPM

## 2021-09-15 VITALS — BODY MASS INDEX: 26.4 KG/M2 | HEIGHT: 61.5 IN

## 2021-09-15 DIAGNOSIS — M81.0 AGE-RELATED OSTEOPOROSIS W/OUT CURRENT PATHOLOGICAL FRACTURE: ICD-10-CM

## 2021-09-15 PROCEDURE — 99203 OFFICE O/P NEW LOW 30 MIN: CPT

## 2021-09-15 RX ORDER — IPRATROPIUM BROMIDE 21 UG/1
0.03 SPRAY NASAL 3 TIMES DAILY
Qty: 1 | Refills: 1 | Status: DISCONTINUED | COMMUNITY
Start: 2020-06-04 | End: 2021-09-15

## 2021-09-15 RX ORDER — IPRATROPIUM BROMIDE 21 UG/1
0.03 SPRAY NASAL 3 TIMES DAILY
Qty: 3 | Refills: 5 | Status: DISCONTINUED | COMMUNITY
Start: 2019-04-19 | End: 2021-09-15

## 2021-09-15 RX ORDER — FLUTICASONE PROPIONATE 50 UG/1
50 SPRAY, METERED NASAL DAILY
Qty: 1 | Refills: 2 | Status: DISCONTINUED | COMMUNITY
Start: 2017-05-02 | End: 2021-09-15

## 2021-09-16 LAB
25(OH)D3 SERPL-MCNC: 44 NG/ML
ANION GAP SERPL CALC-SCNC: 15 MMOL/L
BUN SERPL-MCNC: 18 MG/DL
CALCIUM SERPL-MCNC: 10.1 MG/DL
CHLORIDE SERPL-SCNC: 107 MMOL/L
CO2 SERPL-SCNC: 24 MMOL/L
CREAT SERPL-MCNC: 0.83 MG/DL
GLUCOSE SERPL-MCNC: 104 MG/DL
POTASSIUM SERPL-SCNC: 5.4 MMOL/L
SODIUM SERPL-SCNC: 146 MMOL/L

## 2021-09-21 LAB — ALP BONE SERPL-MCNC: 5 UG/L

## 2021-09-21 NOTE — CONSULT LETTER
[Dear  ___] : Dear  [unfilled], [Consult Letter:] : I had the pleasure of evaluating your patient, [unfilled]. [Please see my note below.] : Please see my note below. [Consult Closing:] : Thank you very much for allowing me to participate in the care of this patient.  If you have any questions, please do not hesitate to contact me. 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  She is at increased fracture risk given her age, height loss, and I recommended starting alendronate weekly to reduce her fracture risk.\par  [FreeTextEntry3] : Georgie Morgan MD\par Division of Endocrinology\par [facility] Physician [facility]

## 2021-09-21 NOTE — PHYSICAL EXAM
[Alert] : alert [No Acute Distress] : no acute distress [No Proptosis] : no proptosis [No Lid Lag] : no lid lag [Normal Hearing] : hearing was normal [No LAD] : no lymphadenopathy [Thyroid Not Enlarged] : the thyroid was not enlarged [Clear to Auscultation] : lungs were clear to auscultation bilaterally [Normal S1, S2] : normal S1 and S2 [Regular Rhythm] : with a regular rhythm [No Spinal Tenderness] : no spinal tenderness [Kyphosis] : kyphosis present [Normal Affect] : the affect was normal [Normal Mood] : the mood was normal [Acanthosis Nigricans] : no acanthosis nigricans [de-identified] : slightly hard of hearing

## 2021-09-21 NOTE — DATA REVIEWED
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

## 2021-09-21 NOTE — ASSESSMENT
[FreeTextEntry1] : High FRAX score, likely due to age.  no other risk factors.\par Recommended starting alendronate 70mg/week to reduce fracture risk.  Instructions given on how to take bisphosphonate: take on empty stomach, with full glass of water and delay eating by 30 minutes. \par Discussed calcium supplementation with patient.  Total recommended dietary calcium intake is 1200mg  but she only has to supplement what she doesn't get in her diet. I recommend getting half of calcium requirement from diet (through vegetable, fish, dairy, aim for 3 servings/day) and half from supplementation (600mg calcium carbonate).  Recommend vitamin D 1000 IU/day.  \par Weight bearing and balance exercises recommended.\par \par Pineal gland cyst, incidental. Typically does not cause symptoms and does not need any hormone workup.\par RTO 6 months

## 2021-09-21 NOTE — HISTORY OF PRESENT ILLNESS
[FreeTextEntry1] : Pt is not sure why she is here.\par on Referral, says pineal gland cyst\par She has fatigue sometimes and headaches.  She had headache two days in a row this week, can last half a day\par no vision changes.  Described as feeling heavy on her forehead.  She went for imaging recently.\par weight is stable\par no heat/cold intolerance\par She walks on treadmill and also walks her dog.\par no falls or problems with balance.  sometimes feels dizzy/lightheaded.\par no history of fracture or FH of hip fracture.  Menopause around 50-57 years old.\par She used to be 5' 5", now is measuring 5' 2.3" with her sneakers.\par Labs from 6/21 reviewed: normal TSH and A1c.\par I was able to pull her MRI and bone density from [facility] portal.  high FRAX score.\par \par meds:\par amlodipine 5mg\par she cannot remember her other meds.  She thinks she is not taking gabapentin or Klonopin

## 2021-10-12 ENCOUNTER — APPOINTMENT (OUTPATIENT)
Dept: NEUROLOGY | Facility: CLINIC | Age: 81
End: 2021-10-12
Payer: MEDICARE

## 2021-10-12 VITALS
WEIGHT: 142 LBS | BODY MASS INDEX: 26.13 KG/M2 | OXYGEN SATURATION: 95 % | TEMPERATURE: 98.1 F | SYSTOLIC BLOOD PRESSURE: 139 MMHG | HEIGHT: 62 IN | HEART RATE: 70 BPM | DIASTOLIC BLOOD PRESSURE: 77 MMHG

## 2021-10-12 PROCEDURE — 99214 OFFICE O/P EST MOD 30 MIN: CPT

## 2021-10-12 NOTE — ASSESSMENT
[FreeTextEntry1] : The patient is a very pleasant 81-year-old female here for follow-up today.\par \par Given my and her RN's concern as well as the patient's own concern for memory impairment, I have again requested neuropsych testing.  Both our office and her RN will work in obtaining an appointment for this. They are aware of the delay in getting an appointment. Since there is significant concern that her mood could also be contributing to her memory difficulties, I have encouraged her to continue working with her PCP to see if adjusting her antidepressant medication is needed.\par \par For her headaches, given that symptoms seem to have worsened since decreasing her dose of gabapentin, a lower daytime dose such as 100 mg daily could be considered.  Tylenol also is a reasonable alternative though she should not use more than 2-3 times per week given the potential for rebound headaches.  We discussed using alternative treatments such as topical treatments or heat/ice.  She should continue with therapy.\par \par Patient will return to clinic to review neuropsych testing results in 6-months.

## 2021-10-12 NOTE — PHYSICAL EXAM
[FreeTextEntry1] : Alert.  Fully oriented.  Speech and language are intact.  Cranial nerves II-XII are intact.  Motor exam reveals intact strength with individual muscle testing in bilateral upper and lower extremities.  Tone is normal.   Sensation is intact to light touch in distal extremities.  Finger-to-nose and heel-to-shin are intact.  Rapid alternating movements are normal in the upper and lower extremities.  Gait is slightly antalgic.\par

## 2021-10-12 NOTE — HISTORY OF PRESENT ILLNESS
[FreeTextEntry1] : The patient is a very pleasant 81-year-old female presenting for follow-up for memory concerns and also has a new concern for chronic headaches.  She is accompanied by her RN.\par \par The patient states she has had headaches for several months now.She describes the pain as a holocephalic head pressure.  Triggers seem to be anxiety and stress.  There is no correlation with blood pressure.  There is no associated migrainous features.  There is no positional component.  She had a prior MRI brain in June when she also had the headaches was notably quite unremarkable except for a 1.3 cm pineal cyst for which she was seen by endocrinology and no further work-up was recommended. SHe denies any associated neurologic symptoms.  She denies snoring to suggest sleep apnea but does use nasal strips to help her breathe at night due to deviated septum.  Headaches seem to have worsened since coming down on the gabapentin.  She is currently taking 300 mg at night.  She was recommended to take Tylenol 600 fill the milligrams twice daily and is currently undergoing physical therapy.\par \par Otherwise, the patient continues to have difficulties with her memory.  She and her RN both feel this may be in part related to isolation and depression.  She is working with her PCP as well who recently increased her sertraline.  She has been trying clonazepam as needed with some improvement in her anxiety.  She has not yet undergone neuropsych testing.

## 2021-11-01 ENCOUNTER — NON-APPOINTMENT (OUTPATIENT)
Age: 81
End: 2021-11-01

## 2021-11-01 ENCOUNTER — APPOINTMENT (OUTPATIENT)
Dept: OPHTHALMOLOGY | Facility: CLINIC | Age: 81
End: 2021-11-01
Payer: MEDICARE

## 2021-11-01 PROCEDURE — 92004 COMPRE OPH EXAM NEW PT 1/>: CPT

## 2021-11-01 PROCEDURE — 92134 CPTRZ OPH DX IMG PST SGM RTA: CPT

## 2021-12-07 ENCOUNTER — APPOINTMENT (OUTPATIENT)
Dept: NEUROLOGY | Facility: CLINIC | Age: 81
End: 2021-12-07

## 2021-12-28 ENCOUNTER — APPOINTMENT (OUTPATIENT)
Dept: NEUROLOGY | Facility: CLINIC | Age: 81
End: 2021-12-28
Payer: MEDICARE

## 2021-12-28 DIAGNOSIS — R51.9 HEADACHE, UNSPECIFIED: ICD-10-CM

## 2021-12-28 PROCEDURE — 99215 OFFICE O/P EST HI 40 MIN: CPT | Mod: 95

## 2021-12-29 PROBLEM — R51.9 BAD HEADACHE: Status: ACTIVE | Noted: 2019-04-10

## 2021-12-29 RX ORDER — FAMOTIDINE 20 MG/1
20 TABLET, FILM COATED ORAL
Qty: 90 | Refills: 0 | Status: ACTIVE | COMMUNITY
Start: 2021-09-23

## 2021-12-29 RX ORDER — VALACYCLOVIR 1 G/1
1 TABLET, FILM COATED ORAL
Qty: 6 | Refills: 0 | Status: ACTIVE | COMMUNITY
Start: 2021-11-11

## 2021-12-29 RX ORDER — ALPRAZOLAM 0.25 MG/1
0.25 TABLET ORAL
Qty: 30 | Refills: 0 | Status: ACTIVE | COMMUNITY
Start: 2021-12-17

## 2021-12-29 NOTE — PHYSICAL EXAM
[FreeTextEntry1] : The patient is alert and oriented x3, naming intact x3, repetition normal, follows three-step commands, and is able to participate fully in the history taking.\par Speech is normal with no evidence of dysarthria.\par \par Memory is intact: Immediate recall 3 out of 3, short-term 3 out of 3, remote memory intact\par \par Cranial nerves II through XII - grossly intact \par \par Motor exam: Upper and lower extremities grossly normal power, No abnormal movements noted.\par \par Coordination and vestibular exam: Finger to nose intact, no evidence of truncal or appendicular ataxia. No evidence of nystagmus. \par \par Gait: deferred\par \par Pulmonary: no respiratory distress, normal respiratory rhythm and effort and no accessory muscle use. \par Skin: normal skin color and pigmentation, no rash and no skin lesions.\par

## 2021-12-29 NOTE — HISTORY OF PRESENT ILLNESS
[Other Location: e.g. School (Enter Location, City,State)___] : at [unfilled], at the time of the visit. [Medical Office: ([facility])___] : at the medical office located in  [Other:____] : [unfilled] [FreeTextEntry1] : Discussed with patient the use of TEB and that while it can provide safe care in a remote setting, there are technical issues that do arise. I explained that this is a billable encounter. She understands that I cannot physically examine her and that she needs to come to the clinic for a physical examination, as needed. Patient verbally agreed. She is accompanied today by her nurse Irlanda who is helping to provide history. She is here for followup of her headaches and cognitive concerns. \par \par She is happy to report that her headaches are significantly improved on the current regimen as prescribed by Dr. Hamilton. She is currently taking 600mg gabapentin QHS, and is tolerating well with no concerning ADEs such as sedation/ imbalance. She takes tylenol as needed, typically 1-2 doses total each week. She has been following closely with her PCP for management of her anxiety, and he has d/colin her buspirone and increased her sertraline to 150mg daily which she thinks is helping. \par \par She still has some concerns regarding her cognition, and is pending upcoming neuropsych appt with Dr Goodman for a more extensive exam. \par \par Pt today says her primary concern is her adoptive daughter who recently moved back from [facility] to [place] and has become very involved in her medical treatment. Pt says her daughter is scheduling multiple appointments for her with alternative providers and she has no desire to go to these appointments. She had imaging of her spine (although she denies any back pain or spinal issues) and her daughter is pushing her to get invasive spinal procedure under anesthesia done. She has been taking pt's xanax for herself. She is constantly telling the patient that she is senile and confused and not able to make her own medical decisions. She is uncomfortable telling her daughter no and keeping her from coming to the apartment. Her daughter brought her to the [facility] where ADELITA scan and other cognitive tests were done and she was told she has dementia. She denies feeling unsafe physically, but does feel she is at risk to be taken advantage of. She feels this is major factor contributing to her overall anxiety and headaches. She is requesting social work involvement.

## 2021-12-29 NOTE — ASSESSMENT
[FreeTextEntry1] : Pt is a 81yoF with PMH chronic headaches, anxiety, with here for f/u of her headaches and short term memory loss. Primary concern today is her adoptive daughter who appears to be pushing her to multiple appointments that isaiah does not necessarily require, encouraging her to get invasive procedures done, and is stealing her medications for her own use. \par \par Had extensive conversation with patient. Discussed her rights as a patient. Discussed safety measures she can take including telling doorman of her building to not let daughter in.  Also advised her to consider switching HCP to her sister who she trusts. Advised that if she ever does not feel physically safe she should call 911. Will reach out to social work for now, consider APS referral.\par \par Headaches well controlled on current regimen- continue with no changes for now\par \par Memory loss- agree with neurocognitive testing for now. Advised to send results of previous cognitive testing and SPECT scan done at [facility]  \par \par F/u in office after appt neurocognitive testing. \par ______________________\par Addendum 12/29- spoke with pt, advised that as per social work it is advised to file report with adult protective services. However today patient and Irlanda ask for me to hold off on doing so. Priscilla has spoken with her  and has switched HCP to her sister as discussed. She will be following back up with her  next week to fill out a power of . She has spoken with her sister who is going to assist in ensuing safe environment for Priscilla with minimal interaction with daughter. She asks that I have social work give her call to be updated on her situation and provide her with other resources, which I will do. I have provided pt with my direct contact information to assist in any other way I can. \par \par Emotional support has been rendered.

## 2021-12-29 NOTE — HISTORY OF PRESENT ILLNESS
[Other Location: e.g. School (Enter Location, City,State)___] : at [unfilled], at the time of the visit. [Medical Office: ([facility]___] : at the medical office located in  [Other:____] : [unfilled] [FreeTextEntry1] : Discussed with patient the use of TEB and that while it can provide safe care in a remote setting, there are technical issues that do arise. I explained that this is a billable encounter. She understands that I cannot physically examine her and that she needs to come to the clinic for a physical examination, as needed. Patient verbally agreed. She is accompanied today by her nurse Irlanda who is helping to provide history. She is here for followup of her headaches and cognitive concerns. \par \par She is happy to report that her headaches are significantly improved on the current regimen as prescribed by Dr. Hamilton. She is currently taking 600mg gabapentin QHS, and is tolerating well with no concerning ADEs such as sedation/ imbalance. She takes tylenol as needed, typically 1-2 doses total each week. She has been following closely with her PCP for management of her anxiety, and he has d/colin her buspirone and increased her sertraline to 150mg daily which she thinks is helping. \par \par She still has some concerns regarding her cognition, and is pending upcoming neuropsych appt with Dr Goodman for a more extensive exam. \par \par Pt today says her primary concern is her adoptive daughter who recently moved back from [facility] to [place] and has become very involved in her medical treatment. Pt says her daughter is scheduling multiple appointments for her with alternative providers and she has no desire to go to these appointments. She had imaging of her spine (although she denies any back pain or spinal issues) and her daughter is pushing her to get invasive spinal procedure under anesthesia done. She has been taking pt's xanax for herself. She is constantly telling the patient that she is senile and confused and not able to make her own medical decisions. She is uncomfortable telling her daughter no and keeping her from coming to the apartment. Her daughter brought her to the [facility] where ADELITA scan and other cognitive tests were done and she was told she has dementia. She denies feeling unsafe physically, but does feel she is at risk to be taken advantage of. She feels this is major factor contributing to her overall anxiety and headaches. She is requesting social work involvement.

## 2021-12-29 NOTE — REASON FOR VISIT
[Home] : at home, [unfilled] , at the time of the visit. [Medical Office: ([facility])___] : at the medical office located in  [Other:____] : [unfilled] [Verbal consent obtained from patient] : the patient, [unfilled] [Follow-Up: _____] : a [unfilled] follow-up visit

## 2021-12-29 NOTE — ASSESSMENT
[FreeTextEntry1] : Pt is a 81yoF with PMH chronic headaches, anxiety, with here for f/u of her headaches and short term memory loss. Primary concern today is her adoptive daughter who appears to be pushing her to multiple appointments that isaiah does not necessarily require, encouraging her to get invasive procedures done, and is stealing her medications for her own use. \par \par Had extensive conversation with patient. Discussed her rights as a patient. Discussed safety measures she can take including telling doorman of her building to not let daughter in.  Also advised her to consider switching HCP to her sister who she trusts. Advised that if she ever does not feel physically safe she should call 911. Will reach out to social work for now, consider APS referral.\par \par Headaches well controlled on current regimen- continue with no changes for now\par \par Memory loss- agree with neurocognitive testing for now. Advised to send results of previous cognitive testing and SPECT scan done at [facility]  \par \par F/u in office after appt neurocognitive testing. \par ______________________\par Addendum 12/29- spoke with pt, advised that as per social work it is advised to file report with adult protective services. However today patient and Irlanda ask for me to hold off on doing so. Priscilla has spoken with her  and has switched HCP to her sister as discussed. She will be following back up with her  next week to fill out a power of . She has spoken with her sister who is going to assist in ensuing safe environment for Priscilla with minimal interaction with daughter. She asks that I have social work give her call to be updated on her situation and provide her with other resources, which I will do. I have provided pt with my direct contact information to assist in any other way I can. \par \par Emotional support has been rendered.

## 2021-12-29 NOTE — REASON FOR VISIT
[Home] : at home, [unfilled] , at the time of the visit. [Medical Office: ([facility])___] : at the medical office located in  [Other:____] : [unfilled] [Verbal consent obtained from patient] : the patient, [unfilled] [Follow-Up: _____] : a [unfilled] follow-up visit

## 2022-01-06 ENCOUNTER — APPOINTMENT (OUTPATIENT)
Dept: NEUROLOGY | Facility: CLINIC | Age: 82
End: 2022-01-06

## 2022-01-17 ENCOUNTER — TRANSCRIPTION ENCOUNTER (OUTPATIENT)
Age: 82
End: 2022-01-17

## 2022-02-03 ENCOUNTER — APPOINTMENT (OUTPATIENT)
Dept: NEUROLOGY | Facility: CLINIC | Age: 82
End: 2022-02-03

## 2022-02-11 ENCOUNTER — APPOINTMENT (OUTPATIENT)
Dept: NEUROLOGY | Facility: CLINIC | Age: 82
End: 2022-02-11

## 2022-03-11 ENCOUNTER — NON-APPOINTMENT (OUTPATIENT)
Age: 82
End: 2022-03-11

## 2022-03-28 ENCOUNTER — RX RENEWAL (OUTPATIENT)
Age: 82
End: 2022-03-28

## 2022-04-19 ENCOUNTER — APPOINTMENT (OUTPATIENT)
Dept: NEUROLOGY | Facility: CLINIC | Age: 82
End: 2022-04-19

## 2022-05-10 ENCOUNTER — APPOINTMENT (OUTPATIENT)
Dept: ENDOCRINOLOGY | Facility: CLINIC | Age: 82
End: 2022-05-10
Payer: MEDICARE

## 2022-05-10 ENCOUNTER — TRANSCRIPTION ENCOUNTER (OUTPATIENT)
Age: 82
End: 2022-05-10

## 2022-05-10 VITALS
HEART RATE: 71 BPM | WEIGHT: 143 LBS | SYSTOLIC BLOOD PRESSURE: 170 MMHG | HEIGHT: 62 IN | BODY MASS INDEX: 26.31 KG/M2 | DIASTOLIC BLOOD PRESSURE: 82 MMHG

## 2022-05-10 PROCEDURE — 99213 OFFICE O/P EST LOW 20 MIN: CPT

## 2022-05-10 RX ORDER — SERTRALINE 25 MG/1
25 TABLET, FILM COATED ORAL
Qty: 90 | Refills: 0 | Status: DISCONTINUED | COMMUNITY
Start: 2021-10-25 | End: 2022-05-10

## 2022-05-10 RX ORDER — ALENDRONATE SODIUM 35 MG/1
35 TABLET ORAL
Qty: 4 | Refills: 0 | Status: DISCONTINUED | COMMUNITY
Start: 2021-08-09 | End: 2022-05-10

## 2022-05-10 RX ORDER — TRIAMCINOLONE ACETONIDE 1 MG/G
0.1 OINTMENT TOPICAL
Qty: 30 | Refills: 0 | Status: ACTIVE | COMMUNITY
Start: 2021-12-30

## 2022-05-10 NOTE — DATA REVIEWED
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

## 2022-05-10 NOTE — ASSESSMENT
[FreeTextEntry1] : High FRAX score, likely due to age.  no other risk factors.\par Continue alendronate, increase to 70mg/week dose.\par Weight bearing and balance exercises recommended. Suggested walking 20-30 min/day (10-15 in am and pm). \par continue vitamin D.\par repeat bone density next May 2023\par RTO June 2023

## 2022-05-10 NOTE — HISTORY OF PRESENT ILLNESS
[FreeTextEntry1] : here with home nurse\par main complaint is pain on L side of head, throbbing\par wax recently removed from ears\par taking alendronate every Thursday without issue,  but she is on 35mg, not 70mg\par no falls or problems with balance. \par \par meds:\par alendronate, started 9/21\par amlodipine 5mg\par Nexletol 180mg\par mirtazapine, sertraline, alprazolam\par gabapentin 300mg /day\par famotidine\par \par she cannot remember her other meds.  She thinks she is not taking gabapentin or Klonopin

## 2022-05-10 NOTE — PHYSICAL EXAM
[Alert] : alert [No Acute Distress] : no acute distress [No Proptosis] : no proptosis [No Lid Lag] : no lid lag [Normal Hearing] : hearing was normal [No LAD] : no lymphadenopathy [Thyroid Not Enlarged] : the thyroid was not enlarged [Clear to Auscultation] : lungs were clear to auscultation bilaterally [Normal S1, S2] : normal S1 and S2 [Regular Rhythm] : with a regular rhythm [No Spinal Tenderness] : no spinal tenderness [Kyphosis] : kyphosis present [Acanthosis Nigricans] : no acanthosis nigricans [Normal Affect] : the affect was normal [Normal Mood] : the mood was normal [de-identified] : slightly hard of hearing

## 2022-05-24 ENCOUNTER — APPOINTMENT (OUTPATIENT)
Dept: NEUROLOGY | Facility: CLINIC | Age: 82
End: 2022-05-24

## 2022-06-17 ENCOUNTER — APPOINTMENT (OUTPATIENT)
Dept: OPHTHALMOLOGY | Facility: CLINIC | Age: 82
End: 2022-06-17

## 2022-07-01 ENCOUNTER — APPOINTMENT (OUTPATIENT)
Dept: OPHTHALMOLOGY | Facility: CLINIC | Age: 82
End: 2022-07-01

## 2022-07-15 ENCOUNTER — APPOINTMENT (OUTPATIENT)
Dept: OPHTHALMOLOGY | Facility: CLINIC | Age: 82
End: 2022-07-15

## 2022-07-28 ENCOUNTER — APPOINTMENT (OUTPATIENT)
Dept: OPHTHALMOLOGY | Facility: CLINIC | Age: 82
End: 2022-07-28

## 2022-08-11 ENCOUNTER — NON-APPOINTMENT (OUTPATIENT)
Age: 82
End: 2022-08-11

## 2022-08-11 ENCOUNTER — APPOINTMENT (OUTPATIENT)
Dept: OPHTHALMOLOGY | Facility: CLINIC | Age: 82
End: 2022-08-11

## 2022-08-11 PROCEDURE — 92134 CPTRZ OPH DX IMG PST SGM RTA: CPT

## 2022-08-11 PROCEDURE — 92014 COMPRE OPH EXAM EST PT 1/>: CPT

## 2022-08-17 ENCOUNTER — NON-APPOINTMENT (OUTPATIENT)
Age: 82
End: 2022-08-17

## 2022-08-18 ENCOUNTER — APPOINTMENT (OUTPATIENT)
Dept: NEUROLOGY | Facility: CLINIC | Age: 82
End: 2022-08-18

## 2022-09-19 ENCOUNTER — APPOINTMENT (OUTPATIENT)
Dept: NEUROLOGY | Facility: CLINIC | Age: 82
End: 2022-09-19

## 2022-09-19 ENCOUNTER — NON-APPOINTMENT (OUTPATIENT)
Age: 82
End: 2022-09-19

## 2022-09-19 PROCEDURE — 96137 PSYCL/NRPSYC TST PHY/QHP EA: CPT

## 2022-09-19 PROCEDURE — 96132 NRPSYC TST EVAL PHYS/QHP 1ST: CPT

## 2022-09-19 PROCEDURE — 96136 PSYCL/NRPSYC TST PHY/QHP 1ST: CPT

## 2022-09-19 PROCEDURE — 96116 NUBHVL XM PHYS/QHP 1ST HR: CPT

## 2022-11-03 ENCOUNTER — APPOINTMENT (OUTPATIENT)
Dept: NEUROLOGY | Facility: CLINIC | Age: 82
End: 2022-11-03

## 2022-12-06 ENCOUNTER — NON-APPOINTMENT (OUTPATIENT)
Age: 82
End: 2022-12-06

## 2023-01-16 NOTE — ED ADULT TRIAGE NOTE - CHIEF COMPLAINT QUOTE
Chief Complaint:  Meghan Chaparro is here today for   Chief Complaint   Patient presents with   • Follow-up     UTI   .    Medications: medications verified and updated  Refills needed today?  NO  Patient would like communication of their results via:Cell Phone:   Telephone Information:   Mobile [phone]     Okay to leave a message containing results? Yes     No LMP recorded. Patient is postmenopausal.      If your visit includes an exam today, would you like an assistant to be present in the room during that time? NO         fall

## 2023-02-09 ENCOUNTER — APPOINTMENT (OUTPATIENT)
Dept: OPHTHALMOLOGY | Facility: CLINIC | Age: 83
End: 2023-02-09

## 2023-02-14 ENCOUNTER — APPOINTMENT (OUTPATIENT)
Dept: NEUROLOGY | Facility: CLINIC | Age: 83
End: 2023-02-14

## 2023-02-23 ENCOUNTER — RX RENEWAL (OUTPATIENT)
Age: 83
End: 2023-02-23

## 2023-03-09 ENCOUNTER — APPOINTMENT (OUTPATIENT)
Dept: OPHTHALMOLOGY | Facility: CLINIC | Age: 83
End: 2023-03-09

## 2023-05-12 ENCOUNTER — NON-APPOINTMENT (OUTPATIENT)
Age: 83
End: 2023-05-12

## 2023-05-12 ENCOUNTER — APPOINTMENT (OUTPATIENT)
Dept: OPHTHALMOLOGY | Facility: CLINIC | Age: 83
End: 2023-05-12
Payer: MEDICARE

## 2023-05-12 PROCEDURE — 92014 COMPRE OPH EXAM EST PT 1/>: CPT

## 2023-05-12 PROCEDURE — 92134 CPTRZ OPH DX IMG PST SGM RTA: CPT

## 2023-05-12 NOTE — ASSESSMENT
[FreeTextEntry1] : The patient is a 81-year-old female presenting for follow-up after recent EMG and MRI L-spine for back/radicular pain.  Overall, I think her symptoms are possibly musculoskeletal in etiology or related to radiculopathy given the MRI findings though this was not found on EMG.  Regardless, symptoms have been improving with over-the-counter creams and NSAIDs. She is interested in PT and i have sent a referral. \par \par She will contact me if she changes her mind regarding cognitive assessment with MRI brain and neuropsych testing.  OTherwise, plan for f/u in 6 months. no

## 2023-06-28 ENCOUNTER — APPOINTMENT (OUTPATIENT)
Dept: ENDOCRINOLOGY | Facility: CLINIC | Age: 83
End: 2023-06-28

## 2023-07-18 ENCOUNTER — APPOINTMENT (OUTPATIENT)
Dept: NEUROLOGY | Facility: CLINIC | Age: 83
End: 2023-07-18

## 2023-08-03 ENCOUNTER — RX RENEWAL (OUTPATIENT)
Age: 83
End: 2023-08-03

## 2023-11-08 ENCOUNTER — APPOINTMENT (OUTPATIENT)
Dept: ENDOCRINOLOGY | Facility: CLINIC | Age: 83
End: 2023-11-08
Payer: MEDICARE

## 2023-11-08 VITALS
SYSTOLIC BLOOD PRESSURE: 127 MMHG | WEIGHT: 138 LBS | BODY MASS INDEX: 25.4 KG/M2 | HEIGHT: 62 IN | DIASTOLIC BLOOD PRESSURE: 76 MMHG | HEART RATE: 75 BPM

## 2023-11-08 PROCEDURE — 99213 OFFICE O/P EST LOW 20 MIN: CPT

## 2023-11-08 RX ORDER — SODIUM SULFATE, POTASSIUM SULFATE, MAGNESIUM SULFATE 17.5; 3.13; 1.6 G/ML; G/ML; G/ML
17.5-3.13-1.6 SOLUTION, CONCENTRATE ORAL
Qty: 354 | Refills: 0 | Status: DISCONTINUED | COMMUNITY
Start: 2021-10-07 | End: 2023-11-08

## 2023-11-08 RX ORDER — DIVALPROEX SODIUM 500 MG/1
500 TABLET, DELAYED RELEASE ORAL
Refills: 0 | Status: ACTIVE | COMMUNITY

## 2023-11-08 RX ORDER — MIRTAZAPINE 7.5 MG/1
7.5 TABLET, FILM COATED ORAL
Qty: 90 | Refills: 0 | Status: DISCONTINUED | COMMUNITY
Start: 2022-01-03 | End: 2023-11-08

## 2023-11-08 RX ORDER — DONEPEZIL HYDROCHLORIDE 10 MG/1
10 TABLET ORAL
Refills: 0 | Status: ACTIVE | COMMUNITY

## 2023-11-08 RX ORDER — BEMPEDOIC ACID 180 MG/1
180 TABLET, FILM COATED ORAL
Qty: 90 | Refills: 0 | Status: DISCONTINUED | COMMUNITY
Start: 2021-05-03 | End: 2023-11-08

## 2023-11-08 RX ORDER — NYSTATIN 100000 U/G
100000 OINTMENT TOPICAL
Qty: 30 | Refills: 0 | Status: DISCONTINUED | COMMUNITY
Start: 2021-01-12 | End: 2023-11-08

## 2023-11-08 RX ORDER — ESCITALOPRAM OXALATE 20 MG/1
20 TABLET, FILM COATED ORAL
Refills: 0 | Status: ACTIVE | COMMUNITY

## 2023-11-08 RX ORDER — CARBIDOPA AND LEVODOPA 25; 100 MG/1; MG/1
25-100 TABLET ORAL
Refills: 0 | Status: ACTIVE | COMMUNITY

## 2023-11-08 RX ORDER — QUETIAPINE 50 MG/1
50 TABLET, FILM COATED ORAL
Refills: 0 | Status: ACTIVE | COMMUNITY

## 2023-11-08 RX ORDER — SERTRALINE HYDROCHLORIDE 100 MG/1
100 TABLET, FILM COATED ORAL
Qty: 90 | Refills: 0 | Status: DISCONTINUED | COMMUNITY
Start: 2021-09-23 | End: 2023-11-08

## 2023-11-09 ENCOUNTER — APPOINTMENT (OUTPATIENT)
Dept: OPHTHALMOLOGY | Facility: CLINIC | Age: 83
End: 2023-11-09
Payer: MEDICARE

## 2023-11-09 ENCOUNTER — NON-APPOINTMENT (OUTPATIENT)
Age: 83
End: 2023-11-09

## 2023-11-09 PROCEDURE — 92134 CPTRZ OPH DX IMG PST SGM RTA: CPT

## 2023-11-09 PROCEDURE — 92014 COMPRE OPH EXAM EST PT 1/>: CPT

## 2023-11-14 ENCOUNTER — APPOINTMENT (OUTPATIENT)
Dept: NEUROLOGY | Facility: CLINIC | Age: 83
End: 2023-11-14
Payer: MEDICARE

## 2023-11-14 DIAGNOSIS — R41.3 OTHER AMNESIA: ICD-10-CM

## 2023-11-14 DIAGNOSIS — R25.1 TREMOR, UNSPECIFIED: ICD-10-CM

## 2023-11-14 PROCEDURE — 99213 OFFICE O/P EST LOW 20 MIN: CPT

## 2024-01-25 ENCOUNTER — RX RENEWAL (OUTPATIENT)
Age: 84
End: 2024-01-25

## 2024-01-25 RX ORDER — ALENDRONATE SODIUM 70 MG/1
70 TABLET ORAL
Qty: 12 | Refills: 3 | Status: ACTIVE | COMMUNITY
Start: 2021-09-15 | End: 1900-01-01

## 2024-03-26 NOTE — ED ADULT TRIAGE NOTE - ADDITIONAL COMPLAINTS
no rashes , no jaundice present , good turgor , no masses , no tenderness on palpation
Additional Complaints

## 2025-01-16 NOTE — ED PROVIDER NOTE - CPE EDP SKIN NORM
Does not use tobacco products, consume alcohol or partake in illicit drug use   Lives with daughter
normal...

## 2025-04-04 ENCOUNTER — APPOINTMENT (OUTPATIENT)
Dept: OPHTHALMOLOGY | Facility: CLINIC | Age: 85
End: 2025-04-04

## 2025-05-23 ENCOUNTER — APPOINTMENT (OUTPATIENT)
Dept: OPHTHALMOLOGY | Facility: CLINIC | Age: 85
End: 2025-05-23

## 2025-06-16 ENCOUNTER — RX RENEWAL (OUTPATIENT)
Age: 85
End: 2025-06-16

## 2025-06-27 ENCOUNTER — APPOINTMENT (OUTPATIENT)
Dept: OPHTHALMOLOGY | Facility: CLINIC | Age: 85
End: 2025-06-27

## 2025-07-24 ENCOUNTER — APPOINTMENT (OUTPATIENT)
Dept: ENDOCRINOLOGY | Facility: CLINIC | Age: 85
End: 2025-07-24
Payer: MEDICARE

## 2025-07-24 DIAGNOSIS — M81.0 AGE-RELATED OSTEOPOROSIS W/OUT CURRENT PATHOLOGICAL FRACTURE: ICD-10-CM

## 2025-07-24 PROCEDURE — 99214 OFFICE O/P EST MOD 30 MIN: CPT | Mod: 2W

## 2025-07-24 PROCEDURE — G2211 COMPLEX E/M VISIT ADD ON: CPT | Mod: 2W

## 2025-07-29 LAB
25(OH)D3 SERPL-MCNC: 25.4 NG/ML
ANION GAP SERPL CALC-SCNC: 12 MMOL/L
BUN SERPL-MCNC: 15 MG/DL
CALCIUM SERPL-MCNC: 9.1 MG/DL
CALCIUM SERPL-MCNC: 9.1 MG/DL
CHLORIDE SERPL-SCNC: 106 MMOL/L
CO2 SERPL-SCNC: 24 MMOL/L
CREAT SERPL-MCNC: 0.96 MG/DL
EGFRCR SERPLBLD CKD-EPI 2021: 59 ML/MIN/1.73M2
GLUCOSE SERPL-MCNC: 84 MG/DL
PARATHYROID HORMONE INTACT: 45 PG/ML
POTASSIUM SERPL-SCNC: 4.2 MMOL/L
SODIUM SERPL-SCNC: 142 MMOL/L

## 2025-07-30 LAB — ALP BONE SERPL-MCNC: 6.2 UG/L

## 2025-08-22 ENCOUNTER — APPOINTMENT (OUTPATIENT)
Dept: OPHTHALMOLOGY | Facility: CLINIC | Age: 85
End: 2025-08-22